# Patient Record
Sex: MALE | URBAN - NONMETROPOLITAN AREA
[De-identification: names, ages, dates, MRNs, and addresses within clinical notes are randomized per-mention and may not be internally consistent; named-entity substitution may affect disease eponyms.]

---
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
## 2023-12-26 NOTE — PROCEDURE: COUNSELING
December 26, 2023      [facility]  [address] [address]  Phone: [phone]       Patient: Izaebl Maldonado   YOB: 1991  Date of Visit: 12/26/2023    To Whom It May Concern:    Ana Maldonado  was at [facility] on 12/26/2023. The patient may return to work/school on DEC 30 2023 with no restrictions. If you have any questions or concerns, or if I can be of further assistance, please do not hesitate to contact me.    Sincerely,    JUSTA SAM MD     
Detail Level: Zone
Detail Level: Generalized